# Patient Record
Sex: FEMALE | Race: WHITE | Employment: FULL TIME | URBAN - METROPOLITAN AREA
[De-identification: names, ages, dates, MRNs, and addresses within clinical notes are randomized per-mention and may not be internally consistent; named-entity substitution may affect disease eponyms.]

---

## 2019-08-15 LAB — PAP-ABSTRACT: NORMAL

## 2020-05-01 ENCOUNTER — VIRTUAL VISIT (OUTPATIENT)
Dept: FAMILY MEDICINE | Facility: OTHER | Age: 60
End: 2020-05-01

## 2020-05-01 NOTE — PROGRESS NOTES
"Date: 2020 07:51:32  Clinician: Sapna Pham  Clinician NPI: [id number]  Patient: Chely [facility]  Patient : 1960  Patient Address: [address] [place] [id number]  Patient Phone: [phone]  Visit Protocol: General skin conditions  Patient Summary:  Chely is a 59 year old ( : 1960 ) female who initiated a Visit for evaluation of an unspecified skin condition. When asked the question \"Please sign me up to receive news, health information and promotions from [employer].\", Chely responded \"No\".    Images of her skin condition were uploaded.  Her symptoms started 1-3 days ago and affect the left side of her body. The skin condition is located on her chest. The skin condition is red in color.   It feels itchy, burning, and tender to touch. The symptoms do not interfere with her sleep.   Symptom details   Redness: The redness has not rapidly increased in size.   The skin condition has changed since the symptoms started. Description of changes as reported by the patient (free text): The size of the area has grown larger since I noticed it yesterday. And it smells yeasty   Denied symptoms include hives, warm to touch, drainage, crusts, blisters, scabs, pimples, numbness, dry/flaky skin, sores, and pain. Chely does not feel feverish. She does not have a rash in the shape of a bull's-eye.   Treatments or home remedies used to relieve the symptoms as reported by the patient (free text): I put corn starch on the area (it is under my left breast). I have had a yeast infection in this area before, and the symptoms are identical. The corn starch helped with the moistness of the area, but it still is burning   Precipitating events   Chely did not come in contact with any irritants prior to the onset of her symptoms and has not been in close contact with anyone that has similar symptoms. She also did not spend time in a wooded area, swim, travel, or spend excess time in the sun just before her " symptoms started. Chely did not get bitten or stung by an insect.   Pertinent medical history  Chely has experienced this skin condition before. Her current skin condition does not come and go. The last time she experienced this skin condition was more than a year ago.   Chely has had chickenpox, but has not had shingles in the past. She has not received a shingles vaccine.   Chely has a history of asthma. She has ongoing medical conditions. Ongoing medical conditions as reported by the patient (free text): Prediabetes, borderline glaucoma, borderline asthma, obesity    Chely does not need a return to work/school note.   Weight: 202 lbs   Chely does not smoke or use smokeless tobacco.   Weight: 202 lbs    MEDICATIONS: atorvastatin oral, Restasis ophthalmic (eye), Prozac oral, ALLERGIES: NKDA  Clinician Response:  Dear Chely,    I will prescribe a topical ointment to help with your skin rash. Keep the area clean and dry and if worsening or not improving follow up for a recheck    Diagnosis: Erythema intertrigo  Diagnosis ICD: L30.4  Prescription: nystatin 100,000 unit/gram topical ointment 1 15 gram tube, 0 days supply. Apply 1 ointment to the affected area(s) topically 2 times per day. Refills: 0, Refill as needed: no, Allow substitutions: yes  [id number] - [phone] - [address], [place] [address]

## 2020-09-29 ENCOUNTER — TRANSFERRED RECORDS (OUTPATIENT)
Dept: MULTI SPECIALTY CLINIC | Facility: CLINIC | Age: 60
End: 2020-09-29

## 2020-12-14 NOTE — PROGRESS NOTES
"Chief Complaint   Patient presents with     Abnormal Uterine Bleeding       Initial Temp 98.2  F (36.8  C) (Oral)   Ht 1.594 m (5' 2.75\")   Wt 85.4 kg (188 lb 3.2 oz)   Breastfeeding No   BMI 33.60 kg/m   Estimated body mass index is 33.6 kg/m  as calculated from the following:    Height as of this encounter: 1.594 m (5' 2.75\").    Weight as of this encounter: 85.4 kg (188 lb 3.2 oz).  BP completed using cuff size: large    Questioned patient about current smoking habits.  Pt. has never smoked.      No obstetric history on file.    The following HM Due: colonoscopy      The following patient reported/Care Every where data was sent to:  P ABSTRACT QUALITY INITIATIVES [[id number]]  Mammogram done on this date: 9/29/2020 (approximately), by this group: [facility], results were normal.   Pap smear done on this date: 8/15/2019 (approximately), by this group: [facility] , results were NIL, HPV negative.       patient has appointment for today and orders have been placed                "

## 2020-12-16 ENCOUNTER — OFFICE VISIT (OUTPATIENT)
Dept: OBGYN | Facility: CLINIC | Age: 60
End: 2020-12-16
Payer: COMMERCIAL

## 2020-12-16 VITALS
TEMPERATURE: 98.2 F | BODY MASS INDEX: 33.35 KG/M2 | DIASTOLIC BLOOD PRESSURE: 74 MMHG | HEIGHT: 63 IN | HEART RATE: 88 BPM | SYSTOLIC BLOOD PRESSURE: 110 MMHG | WEIGHT: 188.2 LBS

## 2020-12-16 DIAGNOSIS — N95.0 PMB (POSTMENOPAUSAL BLEEDING): Primary | ICD-10-CM

## 2020-12-16 DIAGNOSIS — Z12.11 COLON CANCER SCREENING: ICD-10-CM

## 2020-12-16 DIAGNOSIS — R19.8 ABDOMINAL FULLNESS IN SUPRAPUBIC REGION: ICD-10-CM

## 2020-12-16 PROCEDURE — 99203 OFFICE O/P NEW LOW 30 MIN: CPT | Performed by: OBSTETRICS & GYNECOLOGY

## 2020-12-16 RX ORDER — LANCETS
EACH MISCELLANEOUS
COMMUNITY
Start: 2020-09-15

## 2020-12-16 RX ORDER — BLOOD-GLUCOSE METER
EACH MISCELLANEOUS
COMMUNITY
Start: 2020-09-15

## 2020-12-16 RX ORDER — ALBUTEROL SULFATE 90 UG/1
2 AEROSOL, METERED RESPIRATORY (INHALATION)
COMMUNITY
Start: 2020-01-06

## 2020-12-16 RX ORDER — ATORVASTATIN CALCIUM 20 MG/1
20 TABLET, FILM COATED ORAL
COMMUNITY
Start: 2020-08-25

## 2020-12-16 RX ORDER — CITALOPRAM HYDROBROMIDE 20 MG/1
40 TABLET ORAL
COMMUNITY
End: 2020-12-16

## 2020-12-16 RX ORDER — BLOOD SUGAR DIAGNOSTIC
STRIP MISCELLANEOUS
COMMUNITY
Start: 2020-09-15

## 2020-12-16 RX ORDER — IBUPROFEN 200 MG
TABLET ORAL
COMMUNITY

## 2020-12-16 RX ORDER — ALBUTEROL SULFATE 90 UG/1
AEROSOL, METERED RESPIRATORY (INHALATION)
COMMUNITY
Start: 2020-01-06

## 2020-12-16 RX ORDER — NYSTATIN 100000 U/G
OINTMENT TOPICAL
COMMUNITY
Start: 2020-05-01

## 2020-12-16 RX ORDER — FLUOXETINE 40 MG/1
CAPSULE ORAL
COMMUNITY
Start: 2020-11-16

## 2020-12-16 RX ORDER — ESTRADIOL 0.1 MG/G
CREAM VAGINAL
COMMUNITY
Start: 2019-02-25

## 2020-12-16 RX ORDER — METFORMIN HCL 500 MG
500 TABLET, EXTENDED RELEASE 24 HR ORAL
COMMUNITY
Start: 2020-09-15

## 2020-12-16 SDOH — HEALTH STABILITY: MENTAL HEALTH: HOW OFTEN DO YOU HAVE 6 OR MORE DRINKS ON ONE OCCASION?: NOT ASKED

## 2020-12-16 SDOH — HEALTH STABILITY: MENTAL HEALTH: HOW MANY STANDARD DRINKS CONTAINING ALCOHOL DO YOU HAVE ON A TYPICAL DAY?: NOT ASKED

## 2020-12-16 SDOH — HEALTH STABILITY: MENTAL HEALTH: HOW OFTEN DO YOU HAVE A DRINK CONTAINING ALCOHOL?: NOT ASKED

## 2020-12-16 ASSESSMENT — MIFFLIN-ST. JEOR: SCORE: 1383.83

## 2020-12-16 NOTE — PROGRESS NOTES
CC/HPI:  Chely is a 60 year old  who presents for evaluation of post-menopausal bleeding.  Onset:  Last month  Duration: one day  Age of menopause: 8 years ago  Change in weight: No  Early satiety: No.   Rectal bleeding: No  Change in bowel or bladder function: No  HRT: No, on OCPs prior to menopause. Estrace cream vaginally occasionallty.   Precipitating factors: Yes: vaginal polyps. UTI at the same time    Has a suprapubic pressure sensation. Just since the UTI happened, some heaviness. Hasn't gone away since then.    Past Medical History:   Diagnosis Date     Allergic rhinitis        Past Surgical History:   Procedure Laterality Date     APPENDECTOMY OPEN       FACIAL RECONSTRUCTION SURGERY         Family History   Problem Relation Age of Onset     Glaucoma Father      Cancer Father      Skin Cancer Father      Diabetes Mother      Hypertension Mother      Lipids Mother      Arthritis Mother      Diabetes Sister      Alzheimer Disease Maternal Grandmother      Lung Cancer Maternal Grandfather      Other - See Comments Paternal Grandmother         sepsis after child birth     Colon Cancer Paternal Grandfather      Breast Cancer Maternal Aunt      Ovarian Cancer Cousin        Social History     Socioeconomic History     Marital status: Single     Spouse name: Not on file     Number of children: Not on file     Years of education: Not on file     Highest education level: Not on file   Occupational History     Not on file   Social Needs     Financial resource strain: Not on file     Food insecurity     Worry: Not on file     Inability: Not on file     Transportation needs     Medical: Not on file     Non-medical: Not on file   Tobacco Use     Smoking status: Never Smoker     Smokeless tobacco: Never Used   Substance and Sexual Activity     Alcohol use: Yes     Comment: rare     Drug use: Never     Sexual activity: Not Currently   Lifestyle     Physical activity     Days per week: Not on file     Minutes per  session: Not on file     Stress: Not on file   Relationships     Social connections     Talks on phone: Not on file     Gets together: Not on file     Attends Mu-ism service: Not on file     Active member of club or organization: Not on file     Attends meetings of clubs or organizations: Not on file     Relationship status: Not on file     Intimate partner violence     Fear of current or ex partner: Not on file     Emotionally abused: Not on file     Physically abused: Not on file     Forced sexual activity: Not on file   Other Topics Concern     Not on file   Social History Narrative     Not on file         Current Outpatient Medications:      ACCU-CHEK GUIDE test strip, TEST ONCE D UTD, Disp: , Rfl:      atorvastatin (LIPITOR) 20 MG tablet, Take 20 mg by mouth, Disp: , Rfl:      blood glucose monitoring (ACCU-CHEK FASTCLIX) lancets, USE AS DIRECTED ONCE D, Disp: , Rfl:      Blood Glucose Monitoring Suppl (ACCU-CHEK GUIDE) w/Device KIT, USE UTD, Disp: , Rfl:      Calcium Carbonate-Vitamin D (CALCIUM 500 + D PO), Take  by mouth., Disp: , Rfl:      CycloSPORINE (RESTASIS OP), Apply  to eye., Disp: , Rfl:      FLUoxetine (PROZAC) 40 MG capsule, TK 1 C PO D, Disp: , Rfl:      metFORMIN (GLUCOPHAGE-XR) 500 MG 24 hr tablet, Take 500 mg by mouth, Disp: , Rfl:      Omega-3 Fatty Acids (FISH OIL PO), Take  by mouth., Disp: , Rfl:      Xpbvxs-GbRba-FoPrjk-FA-Omega (MULTIVITAMIN/MINERALS PO), Take  by mouth., Disp: , Rfl:      albuterol (PROAIR HFA/PROVENTIL HFA/VENTOLIN HFA) 108 (90 Base) MCG/ACT inhaler, Inhale 2 puffs into the lungs, Disp: , Rfl:      BLACK COHOSH PO, Take  by mouth., Disp: , Rfl:      estradiol (ESTRACE) 0.1 MG/GM vaginal cream, 0.5 gm every night for 1-2 weeks, than once or twice weekly as needed, Disp: , Rfl:      Fexofenadine HCl (ALLEGRA PO), Take  by mouth., Disp: , Rfl:      ibuprofen (ADVIL/MOTRIN) 200 MG tablet, , Disp: , Rfl:      nystatin (MYCOSTATIN) 847681 UNIT/GM external ointment, MALIKA  "EXT AA BID, Disp: , Rfl:      omeprazole (PRILOSEC) 20 MG DR capsule, Take 20 mg by mouth, Disp: , Rfl:      VALERIAN ROOT PO, Take  by mouth., Disp: , Rfl:      VENTOLIN  (90 Base) MCG/ACT inhaler, INHALE 2 PUFFS PO Q 4 H PRN, Disp: , Rfl:     Allergies   Allergen Reactions     Cats      Dust Mites        Exam:  Vitals:    20 0933   BP: 110/74   BP Location: Right arm   Patient Position: Sitting   Cuff Size: Adult Large   Pulse: 88   Temp: 98.2  F (36.8  C)   TempSrc: Oral   Weight: 85.4 kg (188 lb 3.2 oz)   Height: 1.594 m (5' 2.75\")       Gen: well appearing  Abd: soft, NT, ND, no masses or fluid wave  : normal external genitalia, cervix nonparous, uterus normal in size and contour, adnexa normal.    A/P:  Chely is a 60 year old  with post-menopausal bleeding.  - Discussed post-menopausal bleeding and need to exclude malignancy.  - Discussed ultrasound procedure and criteria for excluding malignancy in the setting of PMB. Discussed potential need for EMB if endometrial stripe is greater than 4 mm.  - Discussed endometrial biopsy: procedure, alternatives, risks.  - At the conclusion of the consultation, Chely chose to proceed with ultrasound. She will have a clinic appointment following to discuss results and proceed with biopsy if indicated. Discussed also wanting to evaluate her ovaries by ultrasound given her sensation of pressure and fullness.   - Recommend screening colonoscopy. She has had transportation problems in the past and now thinks she has more options.     (N95.0) PMB (postmenopausal bleeding)  (primary encounter diagnosis)  Comment:   Plan: US Transvaginal Non OB            (R19.8) Abdominal fullness in suprapubic region  Comment:   Plan: US Transvaginal Non OB            (Z12.11) Colon cancer screening  Comment:   Plan: GASTROENTEROLOGY ADULT REF PROCEDURE ONLY            Greater than 50% of this 30 minute appointment was spent in counseling on above issues.                "

## 2020-12-16 NOTE — Clinical Note
Mammogram done on this date: 9/29/2020 (approximately), by this group: [facility], results were normal.   Pap smear done on this date: 8/15/2019 (approximately), by this group: [facility] , results were NIL, HPV negative.

## 2021-01-15 ENCOUNTER — HEALTH MAINTENANCE LETTER (OUTPATIENT)
Age: 61
End: 2021-01-15

## 2021-01-18 ENCOUNTER — ANCILLARY PROCEDURE (OUTPATIENT)
Dept: ULTRASOUND IMAGING | Facility: CLINIC | Age: 61
End: 2021-01-18
Attending: OBSTETRICS & GYNECOLOGY
Payer: COMMERCIAL

## 2021-01-18 ENCOUNTER — OFFICE VISIT (OUTPATIENT)
Dept: OBGYN | Facility: CLINIC | Age: 61
End: 2021-01-18
Attending: OBSTETRICS & GYNECOLOGY
Payer: COMMERCIAL

## 2021-01-18 VITALS
HEART RATE: 99 BPM | WEIGHT: 184.6 LBS | HEIGHT: 63 IN | OXYGEN SATURATION: 98 % | DIASTOLIC BLOOD PRESSURE: 88 MMHG | SYSTOLIC BLOOD PRESSURE: 123 MMHG | BODY MASS INDEX: 32.71 KG/M2

## 2021-01-18 DIAGNOSIS — N95.0 PMB (POSTMENOPAUSAL BLEEDING): ICD-10-CM

## 2021-01-18 DIAGNOSIS — R19.8 ABDOMINAL FULLNESS IN SUPRAPUBIC REGION: ICD-10-CM

## 2021-01-18 DIAGNOSIS — N95.0 PMB (POSTMENOPAUSAL BLEEDING): Primary | ICD-10-CM

## 2021-01-18 PROCEDURE — 88305 TISSUE EXAM BY PATHOLOGIST: CPT | Performed by: PATHOLOGY

## 2021-01-18 PROCEDURE — 58100 BIOPSY OF UTERUS LINING: CPT | Performed by: OBSTETRICS & GYNECOLOGY

## 2021-01-18 PROCEDURE — 76830 TRANSVAGINAL US NON-OB: CPT | Performed by: OBSTETRICS & GYNECOLOGY

## 2021-01-18 PROCEDURE — 99213 OFFICE O/P EST LOW 20 MIN: CPT | Mod: 25 | Performed by: OBSTETRICS & GYNECOLOGY

## 2021-01-18 ASSESSMENT — MIFFLIN-ST. JEOR: SCORE: 1367.5

## 2021-01-19 NOTE — PROGRESS NOTES
"S:   Chely presents for ultrasound review for post-menopausal bleeding.     O:  Vitals:    21 0944   BP: 123/88   Pulse: 99   SpO2: 98%   Weight: 83.7 kg (184 lb 9.6 oz)   Height: 1.594 m (5' 2.75\")     Gen: well appearing  Abd: soft, NT  : atrophic vestibule, intact hymen, no vaginal rugation. Very small anterior cervix.     Feli Kenney on 2021  8:50 AM       Gynecological Ultrasound Report  Pelvic U/S - Transvaginal   [facility]  Referring Provider: Dr. Magui Silva  Sonographer:  Feli Kenney RDMS  Indication: Bleeding/Menses- Postmenopausal bleeding  LMP: No LMP recorded. Patient is postmenopausal.  Gynecological Ultrasonography:   Uterus: retroverted. Contour is irregular w/ myomata: 1 Fundal multiple calcifications throughout 3.6 x 2.9 x 2.5 cm.  Size: 6.3 x 3.3 x 3.4 cm  Endometrium: Thickness Total 5.0 mm  Findings: Distorted by fibroid  Right Ovary: 1.6 x 0.9 x 1.1 cm. Wnl  Left Ovary: 1.1 x 0.8 x 0.7 cm. Wnl  Cul de Sac Free Fluid: No free fluid     Impression: The uterus contains a 3.6 x 2.9 x 2.5 cm calcified fundal fibroid.  The endometrium measures 5 mm, is distorted by the fibroid.  The ovaries are normal.  No free fluid is seen in the pelvis.     Apple Bucio MD         Procedure: Endometrial biopsy.   After informed consent was obtained, the cervix was cleansed with betadyne, grasped with a tenaculum.  The pipel was inserted easily and four quadrant sampling was done.  Sample was sent for pathology.    Tenaculum removed, hemostasis achieved with simple pressure, minimal bleeding. Patient tolerated procedure well.      A/P:  60 year old  with   (N95.0) PMB (postmenopausal bleeding)  (primary encounter diagnosis)  Comment:   Plan: Surgical pathology exam, ENDOMETRIAL BIOPSY W/O        CERVICAL DILATION  Reviewed ultrasound results and images.   Discussed that cancer is excluded with EMS 4 mm or less.  Discussed myoma as likely " cause of PMB in addition to atrophy.   Recommended and performed EMB.  Scant tissue, discussed either cervical canal only assessed versus thin lining.   Will call patient with results.

## 2021-01-20 LAB — COPATH REPORT: NORMAL

## 2021-09-04 ENCOUNTER — HEALTH MAINTENANCE LETTER (OUTPATIENT)
Age: 61
End: 2021-09-04

## 2021-11-18 ENCOUNTER — NURSE TRIAGE (OUTPATIENT)
Dept: NURSING | Facility: CLINIC | Age: 61
End: 2021-11-18
Payer: COMMERCIAL

## 2021-11-18 NOTE — TELEPHONE ENCOUNTER
Abdominal pain started on Nov 2nd. It's like bad menstrual cramps. It ebbs and flows. No bleeding since last December. She will go when she is done with work.   Valentina Corral RN  [facility]      Reason for Disposition    Constant abdominal pain lasting > 2 hours    Additional Information    Negative: Passed out (i.e., fainted, collapsed and was not responding)    Negative: Shock suspected (e.g., cold/pale/clammy skin, too weak to stand, low BP, rapid pulse)    Negative: Sounds like a life-threatening emergency to the triager    Negative: Chest pain    Negative: Pain is mainly in upper abdomen (if needed ask: 'is it mainly above the belly button?')    Negative: Abdominal pain and pregnant > 20 weeks    Negative: Abdominal pain and pregnant < 20 weeks    Negative: SEVERE abdominal pain (e.g., excruciating)     Pain at 6, comes and goes at a higher rate.    Negative: Vomiting red blood or black (coffee ground) material    Negative: Bloody, black, or tarry bowel movements (Exception: chronic-unchanged black-grey bowel movements and is taking iron pills or Pepto-bismol)    Protocols used: ABDOMINAL PAIN - FEMALE-A-OH

## 2022-02-19 ENCOUNTER — HEALTH MAINTENANCE LETTER (OUTPATIENT)
Age: 62
End: 2022-02-19

## 2022-06-30 NOTE — PATIENT INSTRUCTIONS
Endometrial Biopsy  Post-Procedure Patient Instructions      Please monitor for any of the following:      Fever   Cramping after 48 hours   Bleeding for 24-48 hours that is heavier than a normal period   Any bleeding that soaks more than 1 pad per hour      Please call your health care provider if you develop any of the above symptoms or problems.     [facility]   Nurse Triage Line [phone]      Use pads, not tampons, for the bleeding. You may resume sexual relations in 2-3 days after bleeding has stopped.         
Female

## 2022-10-22 ENCOUNTER — HEALTH MAINTENANCE LETTER (OUTPATIENT)
Age: 62
End: 2022-10-22

## 2022-12-04 ENCOUNTER — HEALTH MAINTENANCE LETTER (OUTPATIENT)
Age: 62
End: 2022-12-04

## 2023-04-01 ENCOUNTER — HEALTH MAINTENANCE LETTER (OUTPATIENT)
Age: 63
End: 2023-04-01

## 2024-06-01 ENCOUNTER — HEALTH MAINTENANCE LETTER (OUTPATIENT)
Age: 64
End: 2024-06-01